# Patient Record
Sex: FEMALE | Race: WHITE
[De-identification: names, ages, dates, MRNs, and addresses within clinical notes are randomized per-mention and may not be internally consistent; named-entity substitution may affect disease eponyms.]

---

## 2018-03-04 ENCOUNTER — HOSPITAL ENCOUNTER (EMERGENCY)
Dept: HOSPITAL 17 - NEPE | Age: 73
Discharge: HOME | End: 2018-03-04
Payer: COMMERCIAL

## 2018-03-04 VITALS — HEIGHT: 67 IN | BODY MASS INDEX: 20.76 KG/M2 | WEIGHT: 132.28 LBS

## 2018-03-04 VITALS
OXYGEN SATURATION: 90 % | HEART RATE: 65 BPM | RESPIRATION RATE: 16 BRPM | SYSTOLIC BLOOD PRESSURE: 138 MMHG | TEMPERATURE: 97.9 F | DIASTOLIC BLOOD PRESSURE: 76 MMHG

## 2018-03-04 DIAGNOSIS — M54.5: Primary | ICD-10-CM

## 2018-03-04 LAB
COLOR UR: (no result)
GLUCOSE UR STRIP-MCNC: (no result) MG/DL
HGB UR QL STRIP: (no result)
KETONES UR STRIP-MCNC: (no result) MG/DL
NITRITE UR QL STRIP: (no result)
SP GR UR STRIP: 1.01 (ref 1–1.03)
SQUAMOUS #/AREA URNS HPF: <1 /HPF (ref 0–5)
URINE LEUKOCYTE ESTERASE: (no result)

## 2018-03-04 PROCEDURE — 96372 THER/PROPH/DIAG INJ SC/IM: CPT

## 2018-03-04 PROCEDURE — 99284 EMERGENCY DEPT VISIT MOD MDM: CPT

## 2018-03-04 PROCEDURE — 72100 X-RAY EXAM L-S SPINE 2/3 VWS: CPT

## 2018-03-04 PROCEDURE — 81001 URINALYSIS AUTO W/SCOPE: CPT

## 2018-03-04 NOTE — PD
HPI


Chief Complaint:  Back/ Neck Pain or Injury


Time Seen by Provider:  11:47


Travel History


International Travel<30 days:  No


Contact w/Intl Traveler<30days:  No


Traveled to known affect area:  No





History of Present Illness


HPI


Patient is a 72-year-old female presenting to the emergency department for 

evaluation of back pain.  Patient states it started approximately 2 weeks ago, 

symptom onset was gradual.  Symptoms are exacerbated with movement.  Patient 

reported that yesterday it just "hit her".  She denies any injury or trauma.  

She denies any dysuria, nausea, vomiting, abdominal pain.  She reports a 

history of a lumbar fusion 6 years ago.  She denies any weakness, numbness, 

saddle paresthesia, bladder or bowel incontinence.  Patient took a Tylenol with 

Codeine at 5:30 AM this morning, this gives her minimal relief.  Symptom 

severity is moderate.





PFSH


Past Medical History


Cardiovascular Problems:  Yes


COPD:  Yes


Hypertension:  Yes


Medical other:  Yes (Sarcoidosis)





Past Surgical History


Hysterectomy:  Yes


Oral Surgery:  Yes (POLPYS REMOVED FROM THROAT)


Other Surgery:  Yes (Lumbar fusion)





Social History


Alcohol Use:  No


Tobacco Use:  No


Substance Use:  No





Allergies-Medications


(Allergen,Severity, Reaction):  


Coded Allergies:  


     Sulfa (Sulfonamide Antibiotics) (Unverified  Allergy, Severe, Rash, 8/15/17

)


     No Known Allergies (Verified , 3/27/05)


Reported Meds & Prescriptions





Reported Meds & Active Scripts


Active


Flexeril (Cyclobenzaprine HCl) 5 Mg Tab 5 Mg PO TID PRN








Review of Systems


Except as stated in HPI:  all other systems reviewed are Neg


Musculoskeletal:  Positive: Myalgias, Arthralgias, Pain





Physical Exam


Narrative


GENERAL: Thin, well-developed, alert elderly female.  Presenting in no acute 

distress.


SKIN: Warm and dry.


HEAD: Atraumatic. Normocephalic. 


EYES: Pupils equal and round. No scleral icterus. No injection or drainage. 


ENT: No nasal bleeding or discharge.  Mucous membranes pink and moist.


NECK: Trachea midline. No JVD. 


CARDIOVASCULAR: Regular rate and rhythm.  


RESPIRATORY: No accessory muscle use. Clear to auscultation. Breath sounds 

equal bilaterally. 


GASTROINTESTINAL: Abdomen soft, non-tender, nondistended. Hepatic and splenic 

margins not palpable. 


MUSCULOSKELETAL: Extremities without clubbing, cyanosis, or edema. No obvious 

deformities.  No spinal tenderness or step-off noted.  Positive mild CVAT on 

the left.


NEUROLOGICAL: Awake and alert. No obvious cranial nerve deficits.  Motor 

grossly within normal limits. Five out of 5 muscle strength in the arms and 

legs.  Normal speech.


PSYCHIATRIC: Appropriate mood and affect; insight and judgment normal.





Data


Data


Last Documented VS





Vital Signs








  Date Time  Temp Pulse Resp B/P (MAP) Pulse Ox O2 Delivery O2 Flow Rate FiO2


 


3/4/18 11:44   16     


 


3/4/18 11:38 97.9 65  138/76 (96) 90   








Orders





 Orders


Spine, Lumbar - Ltd (Ap & Lat) (3/4/18 )


Urinalysis - C+S If Indicated (3/4/18 11:52)


Orphenadrine Inj (Norflex Inj) (3/4/18 12:00)


Ed Discharge Order (3/4/18 13:41)





Labs





Laboratory Tests








Test


  3/4/18


12:10


 


Urine Color LIGHT-YELLOW 


 


Urine Turbidity CLEAR 


 


Urine pH 6.5 


 


Urine Specific Gravity 1.007 


 


Urine Protein NEG mg/dL 


 


Urine Glucose (UA) NEG mg/dL 


 


Urine Ketones NEG mg/dL 


 


Urine Occult Blood NEG 


 


Urine Nitrite NEG 


 


Urine Bilirubin NEG 


 


Urine Urobilinogen


  LESS THAN 2.0


MG/DL


 


Urine Leukocyte Esterase NEG 


 


Urine WBC


  LESS THAN 1


/hpf


 


Urine Squamous Epithelial


Cells <1 /hpf 


 


 


Microscopic Urinalysis Comment


  CULT NOT


INDICATED











MDM


Medical Decision Making


Medical Screen Exam Complete:  Yes


Emergency Medical Condition:  Yes


Interpretation(s)





Vital Signs








  Date Time  Temp Pulse Resp B/P (MAP) Pulse Ox O2 Delivery O2 Flow Rate FiO2


 


3/4/18 11:44   16     


 


3/4/18 11:38 97.9 65 16 138/76 (96) 90   








Differential Diagnosis


Muscle strain versus muscle spasm versus radiculopathy versus UTI versus other


Narrative Course


Patient presented for evaluation of low back pain, there is no preceding injury 

or trauma.  There are no focal deficits on exam.  Exam appears consistent with 

muscular skeletal pain however there was mild CVAT on the left, urinalysis 

ordered and pending.


X-ray shows degenerative changes and postsurgical changes.  T12 compression 

fracture is noted.  Age is indeterminate, the findings suggest chronic nature.  

Urinalysis is unremarkable.


Patient observed ambulating in the ED. No gait abnormality. Discussed with my 

attending. Pt stable for discharge.





Diagnosis





 Primary Impression:  


 Low back ache


 Qualified Codes:  M54.5 - Low back pain


Referrals:  


Primary Care Physician


Patient Instructions:  General Instructions, Muscle Spasm (ED), Muscle Strain (

ED)





***Additional Instructions:  





Take medication as directed


Follow up with your primary


Apply warm to the area, continue, avoid exacerbating


Return to the emergency department for any new or worsening symptoms


***Med/Other Pt SpecificInfo:  Prescription(s) given


Scripts


Acetaminophen-Codeine (Tylenol-Codeine #3) 300-30 mg Tab


1 TAB PO Q6H Y for PAIN, #15 TAB 0 Refills


   Prov: Vanessa Fields         3/4/18 


Cyclobenzaprine (Flexeril) 5 Mg Tab


5 MG PO TID Y for MUSCLE SPASM, #30 TAB 0 Refills


   Prov: Vanessa Fields         3/4/18


Disposition:  01 DISCHARGE HOME


Condition:  Stable











Vanessa Fields Mar 4, 2018 11:57

## 2018-03-04 NOTE — RADRPT
EXAM DATE/TIME:  03/04/2018 12:30 

 

[facility] COMPARISON:     

No previous studies available for comparison.

 

                     

INDICATIONS :     

Low back pain, Patient states no trauma. 

                     

 

MEDICAL HISTORY :     

None.          

 

SURGICAL HISTORY :     

Fusion, lumbar.   

 

ENCOUNTER:     

Initial                                        

 

ACUITY:     

1 day      

 

PAIN SCORE:     

7/10

 

LOCATION:       

Lumbar spine 

 

FINDINGS:     

Posterior joe and transpedicular screw fixation at L1-L5 with postlaminectomy changes seen at these l
evels. There severe disc space narrowing throughout the lumbar spine, with slight anterolisthesis of 
L5 on S1. There are no compression deformities in the lumbar spine however a mild compression fractur
e deformity at T12 is noted and age-indeterminate, likely chronic.

 

CONCLUSION:     

Degenerative changes and post surgical changes. T12 compression fracture is noted, age indeterminate 
the findings suggest chronic nature.

 

 

 

 Hugh Kenney MD on March 04, 2018 at 12:45           

Board Certified Radiologist.

 This report was verified electronically.